# Patient Record
Sex: FEMALE | Race: BLACK OR AFRICAN AMERICAN | URBAN - METROPOLITAN AREA
[De-identification: names, ages, dates, MRNs, and addresses within clinical notes are randomized per-mention and may not be internally consistent; named-entity substitution may affect disease eponyms.]

---

## 2023-08-18 ENCOUNTER — IMMUNIZATION (OUTPATIENT)
Age: 63
End: 2023-08-18

## 2023-08-18 NOTE — PROGRESS NOTES
Miguel Keys is here today for a TB Screening Questionnaire. 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 Questionnaire completed with no risk factors identified. Document scanned to patient's chart. Document reviewed by D. Sharlene Burkitt at the [employer], [place]